# Patient Record
Sex: MALE | URBAN - METROPOLITAN AREA
[De-identification: names, ages, dates, MRNs, and addresses within clinical notes are randomized per-mention and may not be internally consistent; named-entity substitution may affect disease eponyms.]

---

## 2017-07-10 ENCOUNTER — APPOINTMENT (RX ONLY)
Dept: URBAN - METROPOLITAN AREA CLINIC 47 | Facility: CLINIC | Age: 32
Setting detail: DERMATOLOGY
End: 2017-07-10

## 2017-07-10 VITALS — HEIGHT: 74 IN | WEIGHT: 260 LBS

## 2017-07-10 DIAGNOSIS — L40.3 PUSTULOSIS PALMARIS ET PLANTARIS: ICD-10-CM

## 2017-07-10 PROBLEM — I10 ESSENTIAL (PRIMARY) HYPERTENSION: Status: ACTIVE | Noted: 2017-07-10

## 2017-07-10 PROBLEM — L85.3 XEROSIS CUTIS: Status: ACTIVE | Noted: 2017-07-10

## 2017-07-10 PROBLEM — L29.8 OTHER PRURITUS: Status: ACTIVE | Noted: 2017-07-10

## 2017-07-10 PROBLEM — L20.84 INTRINSIC (ALLERGIC) ECZEMA: Status: ACTIVE | Noted: 2017-07-10

## 2017-07-10 PROBLEM — L30.9 DERMATITIS, UNSPECIFIED: Status: ACTIVE | Noted: 2017-07-10

## 2017-07-10 PROBLEM — F41.9 ANXIETY DISORDER, UNSPECIFIED: Status: ACTIVE | Noted: 2017-07-10

## 2017-07-10 PROCEDURE — ? PRESCRIPTION

## 2017-07-10 PROCEDURE — ? OTHER

## 2017-07-10 PROCEDURE — ? TREATMENT REGIMEN

## 2017-07-10 PROCEDURE — 99202 OFFICE O/P NEW SF 15 MIN: CPT

## 2017-07-10 RX ORDER — CALCIPOTRIENE 50 UG/G
CREAM TOPICAL
Qty: 1 | Refills: 1 | Status: ERX | COMMUNITY
Start: 2017-07-10

## 2017-07-10 RX ORDER — CLOBETASOL PROPIONATE 0.5 MG/G
OINTMENT TOPICAL
Qty: 1 | Refills: 1 | Status: ERX | COMMUNITY
Start: 2017-07-10

## 2017-07-10 RX ADMIN — CLOBETASOL PROPIONATE: 0.5 OINTMENT TOPICAL at 21:30

## 2017-07-10 RX ADMIN — CALCIPOTRIENE: 50 CREAM TOPICAL at 21:30

## 2017-07-10 NOTE — PROCEDURE: TREATMENT REGIMEN
Other Instructions: Patient states that he is having high blood pressure, weight gain, and kidney stones due to years of treatment with steroids.
Action 4: Continue
Start Regimen: Clobetasol ointment -gen rx
Samples Given: Otezla
Detail Level: Zone

## 2017-07-10 NOTE — PROCEDURE: OTHER
Detail Level: Zone
Note Text (......Xxx Chief Complaint.): This diagnosis correlates with the
Other (Free Text): Obtain other biopsy reports

## 2017-08-01 ENCOUNTER — APPOINTMENT (RX ONLY)
Dept: URBAN - METROPOLITAN AREA CLINIC 47 | Facility: CLINIC | Age: 32
Setting detail: DERMATOLOGY
End: 2017-08-01

## 2017-08-01 VITALS — HEIGHT: 72 IN | WEIGHT: 250 LBS

## 2017-08-01 DIAGNOSIS — L40.3 PUSTULOSIS PALMARIS ET PLANTARIS: ICD-10-CM | Status: INADEQUATELY CONTROLLED

## 2017-08-01 PROCEDURE — ? PRESCRIPTION

## 2017-08-01 PROCEDURE — ? OTHER

## 2017-08-01 PROCEDURE — 99214 OFFICE O/P EST MOD 30 MIN: CPT

## 2017-08-01 PROCEDURE — ? COUNSELING

## 2017-08-01 PROCEDURE — ? TREATMENT REGIMEN

## 2017-08-01 RX ORDER — HYDROXYZINE HYDROCHLORIDE 25 MG/1
TABLET, FILM COATED ORAL
Qty: 30 | Refills: 3 | Status: ERX | COMMUNITY
Start: 2017-08-01

## 2017-08-01 RX ADMIN — HYDROXYZINE HYDROCHLORIDE: 25 TABLET, FILM COATED ORAL at 22:07

## 2017-08-01 NOTE — PROCEDURE: OTHER
Other (Free Text): Diff Dx includes Eczematous derm
Detail Level: Zone
Note Text (......Xxx Chief Complaint.): This diagnosis correlates with the

## 2017-08-01 NOTE — PROCEDURE: TREATMENT REGIMEN
Otc Regimen: Urea 20%
Start Regimen: Otezla
Continue Regimen: Clobetasol ointment -gen rx\\nCalcipotriene
Action 4: Continue
Other Instructions: Patient states that he is having high blood pressure, weight gain, and kidney stones due to years of treatment with steroids.
Samples Given: Otezla
Detail Level: Zone

## 2017-08-23 ENCOUNTER — APPOINTMENT (RX ONLY)
Dept: URBAN - METROPOLITAN AREA CLINIC 47 | Facility: CLINIC | Age: 32
Setting detail: DERMATOLOGY
End: 2017-08-23

## 2017-09-06 ENCOUNTER — RX ONLY (OUTPATIENT)
Age: 32
Setting detail: RX ONLY
End: 2017-09-06

## 2017-09-06 ENCOUNTER — APPOINTMENT (RX ONLY)
Dept: URBAN - METROPOLITAN AREA CLINIC 47 | Facility: CLINIC | Age: 32
Setting detail: DERMATOLOGY
End: 2017-09-06

## 2017-09-06 DIAGNOSIS — Z79.899 OTHER LONG TERM (CURRENT) DRUG THERAPY: ICD-10-CM

## 2017-09-06 DIAGNOSIS — L40.3 PUSTULOSIS PALMARIS ET PLANTARIS: ICD-10-CM | Status: INADEQUATELY CONTROLLED

## 2017-09-06 PROCEDURE — ? OTHER

## 2017-09-06 PROCEDURE — ? COUNSELING

## 2017-09-06 PROCEDURE — ? HIGH RISK MEDICATION MONITORING

## 2017-09-06 PROCEDURE — ? ORDER TESTS

## 2017-09-06 PROCEDURE — ? TREATMENT REGIMEN

## 2017-09-06 PROCEDURE — 99214 OFFICE O/P EST MOD 30 MIN: CPT

## 2017-09-06 RX ORDER — CALCIPOTRIENE 50 UG/G
CREAM TOPICAL
Qty: 1 | Refills: 1 | Status: ERX

## 2017-09-06 RX ORDER — CLOBETASOL PROPIONATE 0.5 MG/G
OINTMENT TOPICAL
Qty: 1 | Refills: 1 | Status: ERX

## 2017-09-06 ASSESSMENT — LOCATION SIMPLE DESCRIPTION DERM
LOCATION SIMPLE: LEFT FOREARM
LOCATION SIMPLE: LEFT HAND
LOCATION SIMPLE: RIGHT FOREARM
LOCATION SIMPLE: RIGHT HAND

## 2017-09-06 ASSESSMENT — PGA PSORIASIS: PGA PSORIASIS: MARKED (MARKED PLAQUE ELEVATION, BRIGHT ERYTHEMA, THICK NONTENACIOUS SCALE PREDOMINATES)

## 2017-09-06 ASSESSMENT — LOCATION DETAILED DESCRIPTION DERM
LOCATION DETAILED: RIGHT RADIAL DORSAL HAND
LOCATION DETAILED: LEFT PROXIMAL DORSAL FOREARM
LOCATION DETAILED: LEFT DORSAL RING METACARPOPHALANGEAL JOINT
LOCATION DETAILED: RIGHT PROXIMAL DORSAL FOREARM

## 2017-09-06 ASSESSMENT — LOCATION ZONE DERM
LOCATION ZONE: ARM
LOCATION ZONE: HAND

## 2017-09-06 ASSESSMENT — PAIN INTENSITY VAS: HOW INTENSE IS YOUR PAIN 0 BEING NO PAIN, 10 BEING THE MOST SEVERE PAIN POSSIBLE?: 10/10 PAIN

## 2017-09-06 ASSESSMENT — BSA RASH: BSA RASH: 15

## 2017-09-06 NOTE — PROCEDURE: ORDER TESTS
Bill For Surgical Tray: no
Performing Laboratory: [id number]
Billing Type: Third-Party Bill
Expected Date Of Service: 09/06/2017

## 2017-09-06 NOTE — PROCEDURE: TREATMENT REGIMEN
Other Instructions: Patient  has failed otezla, will start on cosentyx
Otc Regimen: Urea 20%
Action 2: Continue
Continue Regimen: Clobetasol ointment -gen rx\\nCalcipotriene\\nOtezla until consentyx approved
Detail Level: Zone

## 2017-09-06 NOTE — PROCEDURE: OTHER
Note Text (......Xxx Chief Complaint.): This diagnosis correlates with the
Other (Free Text): Diff Dx includes Eczematous derm
Detail Level: Zone